# Patient Record
Sex: MALE | Race: WHITE | URBAN - METROPOLITAN AREA
[De-identification: names, ages, dates, MRNs, and addresses within clinical notes are randomized per-mention and may not be internally consistent; named-entity substitution may affect disease eponyms.]

---

## 2019-09-09 ENCOUNTER — OFFICE VISIT (OUTPATIENT)
Dept: URBAN - METROPOLITAN AREA CLINIC 22 | Facility: CLINIC | Age: 59
End: 2019-09-09
Payer: COMMERCIAL

## 2019-09-09 PROCEDURE — 92310 CONTACT LENS FITTING OU: CPT | Performed by: OPTOMETRIST

## 2019-09-09 PROCEDURE — 92015 DETERMINE REFRACTIVE STATE: CPT | Performed by: OPTOMETRIST

## 2019-09-09 PROCEDURE — 92012 INTRM OPH EXAM EST PATIENT: CPT | Performed by: OPTOMETRIST

## 2019-09-09 ASSESSMENT — KERATOMETRY
OS: 45.43
OD: 45.65

## 2019-09-09 ASSESSMENT — INTRAOCULAR PRESSURE
OD: 16
OS: 15

## 2019-09-09 ASSESSMENT — VISUAL ACUITY
OD: 20/20
OS: 20/20

## 2019-09-09 NOTE — IMPRESSION/PLAN
Impression: Hypermetropia, bilateral: H52.03. OU. Plan: Dispensed new for glasses and contacts Rx, discussed changes with patient.

## 2020-09-15 ENCOUNTER — OFFICE VISIT (OUTPATIENT)
Dept: URBAN - METROPOLITAN AREA CLINIC 22 | Facility: CLINIC | Age: 60
End: 2020-09-15
Payer: COMMERCIAL

## 2020-09-15 DIAGNOSIS — H52.03 HYPERMETROPIA, BILATERAL: Primary | ICD-10-CM

## 2020-09-15 PROCEDURE — 92014 COMPRE OPH EXAM EST PT 1/>: CPT | Performed by: OPTOMETRIST

## 2020-09-15 PROCEDURE — 92015 DETERMINE REFRACTIVE STATE: CPT | Performed by: OPTOMETRIST

## 2020-09-15 PROCEDURE — 92310 CONTACT LENS FITTING OU: CPT | Performed by: OPTOMETRIST

## 2020-09-15 ASSESSMENT — KERATOMETRY
OS: 47.18
OD: 45.76

## 2020-09-15 ASSESSMENT — INTRAOCULAR PRESSURE
OD: 16
OS: 17

## 2020-09-15 ASSESSMENT — VISUAL ACUITY
OS: 20/20
OD: 20/20

## 2020-09-15 NOTE — IMPRESSION/PLAN
Impression: Hypermetropia, bilateral: H52.03. Bilateral. Plan: Rx new prescription, fill as directed.  Recommended Blink for CLS and Lumify before putting CLS in.

## 2021-09-30 ENCOUNTER — OFFICE VISIT (OUTPATIENT)
Dept: URBAN - METROPOLITAN AREA CLINIC 22 | Facility: CLINIC | Age: 61
End: 2021-09-30
Payer: COMMERCIAL

## 2021-09-30 PROCEDURE — 92310 CONTACT LENS FITTING OU: CPT | Performed by: STUDENT IN AN ORGANIZED HEALTH CARE EDUCATION/TRAINING PROGRAM

## 2021-09-30 PROCEDURE — 92014 COMPRE OPH EXAM EST PT 1/>: CPT | Performed by: STUDENT IN AN ORGANIZED HEALTH CARE EDUCATION/TRAINING PROGRAM

## 2021-09-30 ASSESSMENT — INTRAOCULAR PRESSURE
OD: 15
OS: 15

## 2021-09-30 ASSESSMENT — VISUAL ACUITY
OS: 20/20
OD: 20/20

## 2021-09-30 ASSESSMENT — KERATOMETRY
OD: 45.50
OS: 45.50

## 2021-09-30 NOTE — IMPRESSION/PLAN
Impression: Hypermetropia, bilateral: H52.03. Plan: Discussed findings. New glasses and contact lens Rx finalized and given to patient. Patient prefers to stick with [employer].